# Patient Record
Sex: FEMALE | Race: WHITE | NOT HISPANIC OR LATINO | Employment: FULL TIME | URBAN - NONMETROPOLITAN AREA
[De-identification: names, ages, dates, MRNs, and addresses within clinical notes are randomized per-mention and may not be internally consistent; named-entity substitution may affect disease eponyms.]

---

## 2017-10-09 ENCOUNTER — TRANSCRIBE ORDERS (OUTPATIENT)
Dept: ADMINISTRATIVE | Facility: HOSPITAL | Age: 42
End: 2017-10-09

## 2017-10-09 DIAGNOSIS — Z12.31 VISIT FOR SCREENING MAMMOGRAM: Primary | ICD-10-CM

## 2019-10-23 ENCOUNTER — OFFICE VISIT (OUTPATIENT)
Dept: OBSTETRICS AND GYNECOLOGY | Facility: CLINIC | Age: 44
End: 2019-10-23

## 2019-10-23 VITALS
HEIGHT: 60 IN | BODY MASS INDEX: 30.39 KG/M2 | WEIGHT: 154.8 LBS | DIASTOLIC BLOOD PRESSURE: 76 MMHG | SYSTOLIC BLOOD PRESSURE: 120 MMHG

## 2019-10-23 DIAGNOSIS — N80.9 ENDOMETRIOSIS: ICD-10-CM

## 2019-10-23 DIAGNOSIS — G89.29 CHRONIC PELVIC PAIN IN FEMALE: ICD-10-CM

## 2019-10-23 DIAGNOSIS — Z87.42 HISTORY OF OVARIAN CYST: Primary | ICD-10-CM

## 2019-10-23 DIAGNOSIS — R10.2 CHRONIC PELVIC PAIN IN FEMALE: ICD-10-CM

## 2019-10-23 PROCEDURE — 99203 OFFICE O/P NEW LOW 30 MIN: CPT | Performed by: OBSTETRICS & GYNECOLOGY

## 2019-10-23 RX ORDER — OMEPRAZOLE 20 MG/1
1 TABLET, DELAYED RELEASE ORAL 2 TIMES DAILY
Status: ON HOLD | COMMUNITY
Start: 2015-03-13 | End: 2019-12-16

## 2019-10-23 RX ORDER — ESCITALOPRAM OXALATE 20 MG/1
1 TABLET ORAL DAILY
COMMUNITY
Start: 2019-10-21

## 2019-10-23 RX ORDER — NORETHINDRONE ACETATE/ETHINYL ESTRADIOL AND FERROUS FUMARATE 1MG-20(24)
1 KIT ORAL DAILY
COMMUNITY
Start: 2019-10-12 | End: 2020-01-13

## 2019-10-23 NOTE — PROGRESS NOTES
"   Chief Complaint   Patient presents with   • Pelvic Pain     also c/o right sided pain, right sided back pain, and pain in right thigh   • Vaginal Bleeding     patient takes continuous birth control pills, states that she hasn't had a period in years, but had some bleeding today.  being treated with BCP's due to ovarian cyst formation.  s/p endometrial ablation.       Chloé Velez is a 44 y.o. year old  presenting to be seen because of complaints of chronic pelvic pain and recurrent ovarian cyst.  This patient has 3 living children with one set of twins.  She had 2 vaginal deliveries.  She had a miscarriage with a suction D&C.  She has had a procedure of combined laparoscopy with tubal sterilization, CO2 laser ablation of endometriosis, and a hysteroscopy/D&C/endometrial ablation.  She is also had a cholecystectomy for chronic cholecystitis.  She currently takes Caleb FE oral contraceptives to suppress her ovaries.  She has had recent breakthrough bleeding and has had persistent right lower quadrant pain.  She denies bowel or urinary symptoms.    Social History     Substance and Sexual Activity   Sexual Activity Yes   • Partners: Male   • Birth control/protection: Surgical     SCREENING TESTS    2012 2016 2017 2018 2019   Age                         PAP        \"Neg\"                 HPV high risk                         Mammogram                         DAT score                         Breast MRI                         Lipids                         Vitamin D                         Colonoscopy                         DEXA  Frax (hip/any)                         Ovarian Screen                             No Additional Complaints Reported    The following portions of the patient's history were reviewed and updated as appropriate:vital signs and   She  has a past medical history of Endometriosis and History of " "ovarian cyst.  She does not have any pertinent problems on file.  She  has a past surgical history that includes Cholecystectomy; Breast biopsy (Left, 1993); Breast biopsy (Left, 2008); Breast cyst excision (Left); Pelvic laparoscopy; d&c with suction; and hysteroscopy endometrial ablation tubal sterilization.  Her family history includes Breast cancer in her maternal grandmother; Colon cancer in her paternal grandfather; Heart disease in her father.  She  reports that she has never smoked. She has never used smokeless tobacco. She reports that she does not drink alcohol or use drugs.  Current Outpatient Medications   Medication Sig Dispense Refill   • omeprazole OTC (PrilOSEC OTC) 20 MG EC tablet Take 1 tablet by mouth 2 (Two) Times a Day.     • escitalopram (LEXAPRO) 20 MG tablet Take 1 tablet by mouth Daily.     • ZENOBIA 24 FE 1-20 MG-MCG(24) per tablet Take 1 tablet by mouth Daily.       No current facility-administered medications for this visit.      She has No Known Allergies..        Review of Systems  A comprehensive review of systems was done.  Constitutional: negative for fever, chills, activity change, appetite change, fatigue and unexpected weight change.  Respiratory: negative  Cardiovascular: negative  Gastrointestinal: negative  Genitourinary:positive for pelvic pain  Musculoskeletal:negative  Behavioral/Psych: negative          /76   Ht 152.4 cm (60\")   Wt 70.2 kg (154 lb 12.8 oz)   Breastfeeding? No   BMI 30.23 kg/m²     Physical Exam    General:  alert; cooperative; well developed; well nourished   Skin:  No suspicious lesions seen   Thyroid: normal to inspection and palpation   Lungs:  clear to auscultation bilaterally   Heart:  regular rate and rhythm, S1, S2 normal, no murmur, click, rub or gallop   Breasts:  Not performed.   Abdomen: soft, non-tender; no masses  no umbilical or inguinal hernias are present  no hepato-splenomegaly   Pelvis: Clinical staff was present for exam  External " genitalia:  normal appearance of the external genitalia including Bartholin's and [place]'s glands.  Vaginal:  normal pink mucosa without prolapse or lesions.  Cervix:  normal appearance.  Uterus:  Irregular in shape and mildly enlarged.  ? left adnexal fullness  Adnexa:  fullness left adnexa  Rectal:  anus visually normal appearing. recto-vaginal exam unremarkable and confirms findings;     Lab Review   No data reviewed    Imaging   No data reviewed    Medical counseling was given to patient for the following topics: diagnostic results, instructions for management, risk factor reductions, prognosis and impressions . Total time of the encounter was 33 minute(s) and 19 minute(s)  was spent in face to face counseling.  I have discussed the patient's medical history with her in detail.  The patient desires to have treatment here in [place].  She desires to have a procedure to enable her to be free of pain.  I have informed the patient that there can be no guarantee of pain relief regardless of the procedure that we choose.  I have counseled her that she needs a pelvic ultrasound for further evaluation of the uterus, tubes, and ovaries.  I have counseled her that if she wants to be conservative she could have a unilateral salpingo-oophorectomy to attempt to relieve her pain and this could be done laparoscopically.  I have counseled her that a more definitive treatment would be removal of the uterus, tubes, and ovaries and this could be done in a minimally-invasive fashion.  She is agreeable to have an ultrasound and we will review these results before deciding on a method of management.          ASSESSMENT  Problems Addressed this Visit        Other    History of ovarian cyst - Primary    Relevant Orders    US Non-ob Transvaginal    Endometriosis      Other Visit Diagnoses     Chronic pelvic pain in female        Relevant Orders    US Non-ob Transvaginal           Substance History:    reports that she has never smoked.  She has never used smokeless tobacco.    reports that she does not drink alcohol.    reports that she does not use drugs.    Substance use counseling is not indicated based on patient history.      PLAN    · Medications prescribed this encounter:  No orders of the defined types were placed in this encounter.  · Pelvic ultrasound scan-a normal size anteverted uterus.  The endometrial stripe cannot be measured due to prior ablation.  Her ovaries are normal in size.  · I have indicated to the patient that I would prefer that she have a CT scan of the abdomen and pelvis to rule out nephrolithiasis.  If she does not have any evidence of renal stones I would consider doing a laparoscopic right salpingo-oophorectomy for chronic right lower quadrant pain and recurrent cyst formation.  She will notify me if she desires this procedure.  I have discussed the risks of laparoscopy with her.  · She is taking Caleb FE oral contracteptives  · Follow up: PRN based on ultrasound scan  · Calcium, 600 mg/ Vit. D, 400 IU daily     *Please note that portions of this documentation may have been completed with a voice recognition program.  Efforts were made to edit this dictation, but occasional words may have been mistranscribed.     This note was electronically signed.    ANTONIO Vela MD  October 23, 2019  1:34 PM

## 2019-10-24 DIAGNOSIS — M54.9 OTHER CHRONIC BACK PAIN: ICD-10-CM

## 2019-10-24 DIAGNOSIS — G89.29 OTHER CHRONIC BACK PAIN: ICD-10-CM

## 2019-10-24 DIAGNOSIS — R10.30 LOWER ABDOMINAL PAIN: Primary | ICD-10-CM

## 2019-10-24 DIAGNOSIS — R10.84 GENERALIZED ABDOMINAL PAIN: ICD-10-CM

## 2019-10-29 ENCOUNTER — HOSPITAL ENCOUNTER (OUTPATIENT)
Dept: CT IMAGING | Facility: HOSPITAL | Age: 44
Discharge: HOME | End: 2019-10-29
Admitting: SURGERY

## 2019-10-29 DIAGNOSIS — R10.30 LOWER ABDOMINAL PAIN: ICD-10-CM

## 2019-10-29 DIAGNOSIS — G89.29 CHRONIC PELVIC PAIN IN FEMALE: Primary | ICD-10-CM

## 2019-10-29 DIAGNOSIS — G89.29 OTHER CHRONIC BACK PAIN: ICD-10-CM

## 2019-10-29 DIAGNOSIS — M54.9 OTHER CHRONIC BACK PAIN: ICD-10-CM

## 2019-10-29 DIAGNOSIS — R10.2 CHRONIC PELVIC PAIN IN FEMALE: Primary | ICD-10-CM

## 2019-10-29 DIAGNOSIS — R10.84 GENERALIZED ABDOMINAL PAIN: ICD-10-CM

## 2019-10-29 PROCEDURE — 0 IOVERSOL 68 % SOLUTION: Performed by: SURGERY

## 2019-10-29 PROCEDURE — 74177 CT ABD & PELVIS W/CONTRAST: CPT | Performed by: RADIOLOGY

## 2019-10-29 PROCEDURE — 74177 CT ABD & PELVIS W/CONTRAST: CPT

## 2019-10-29 RX ADMIN — IOVERSOL 100 ML: 678 INJECTION INTRA-ARTERIAL; INTRAVENOUS at 08:57

## 2019-10-30 PROBLEM — R10.2 CHRONIC PELVIC PAIN IN FEMALE: Status: ACTIVE | Noted: 2019-10-30

## 2019-10-30 PROBLEM — G89.29 CHRONIC PELVIC PAIN IN FEMALE: Status: ACTIVE | Noted: 2019-10-30

## 2019-12-04 ENCOUNTER — APPOINTMENT (OUTPATIENT)
Dept: PREADMISSION TESTING | Facility: HOSPITAL | Age: 44
End: 2019-12-04

## 2019-12-04 ENCOUNTER — OFFICE VISIT (OUTPATIENT)
Dept: OBSTETRICS AND GYNECOLOGY | Facility: CLINIC | Age: 44
End: 2019-12-04

## 2019-12-04 VITALS
BODY MASS INDEX: 30.86 KG/M2 | SYSTOLIC BLOOD PRESSURE: 124 MMHG | HEIGHT: 60 IN | HEART RATE: 74 BPM | WEIGHT: 157.2 LBS | OXYGEN SATURATION: 100 % | DIASTOLIC BLOOD PRESSURE: 84 MMHG

## 2019-12-04 VITALS — HEIGHT: 60 IN | WEIGHT: 157 LBS | BODY MASS INDEX: 30.82 KG/M2

## 2019-12-04 DIAGNOSIS — N80.9 ENDOMETRIOSIS: ICD-10-CM

## 2019-12-04 DIAGNOSIS — Z01.818 PREOPERATIVE TESTING: ICD-10-CM

## 2019-12-04 DIAGNOSIS — G89.29 CHRONIC PELVIC PAIN IN FEMALE: Primary | ICD-10-CM

## 2019-12-04 DIAGNOSIS — R10.2 CHRONIC PELVIC PAIN IN FEMALE: Primary | ICD-10-CM

## 2019-12-04 LAB
ANION GAP SERPL CALCULATED.3IONS-SCNC: 10 MMOL/L (ref 5–15)
BACTERIA UR QL AUTO: NORMAL /HPF
BASOPHILS # BLD AUTO: 0.04 10*3/MM3 (ref 0–0.2)
BASOPHILS NFR BLD AUTO: 0.5 % (ref 0–1.5)
BILIRUB UR QL STRIP: NEGATIVE
BUN BLD-MCNC: 11 MG/DL (ref 6–20)
BUN/CREAT SERPL: 15.5 (ref 7–25)
CALCIUM SPEC-SCNC: 9.8 MG/DL (ref 8.6–10.5)
CHLORIDE SERPL-SCNC: 103 MMOL/L (ref 98–107)
CLARITY UR: CLEAR
CO2 SERPL-SCNC: 27 MMOL/L (ref 22–29)
COLOR UR: YELLOW
CREAT BLD-MCNC: 0.71 MG/DL (ref 0.57–1)
DEPRECATED RDW RBC AUTO: 44.9 FL (ref 37–54)
EOSINOPHIL # BLD AUTO: 0.14 10*3/MM3 (ref 0–0.4)
EOSINOPHIL NFR BLD AUTO: 1.7 % (ref 0.3–6.2)
ERYTHROCYTE [DISTWIDTH] IN BLOOD BY AUTOMATED COUNT: 13.6 % (ref 12.3–15.4)
GFR SERPL CREATININE-BSD FRML MDRD: 90 ML/MIN/1.73
GLUCOSE BLD-MCNC: 94 MG/DL (ref 65–99)
GLUCOSE UR STRIP-MCNC: NEGATIVE MG/DL
HCT VFR BLD AUTO: 42.8 % (ref 34–46.6)
HGB BLD-MCNC: 13.4 G/DL (ref 12–15.9)
HGB UR QL STRIP.AUTO: ABNORMAL
HYALINE CASTS UR QL AUTO: NORMAL /LPF
IMM GRANULOCYTES # BLD AUTO: 0.02 10*3/MM3 (ref 0–0.05)
IMM GRANULOCYTES NFR BLD AUTO: 0.2 % (ref 0–0.5)
KETONES UR QL STRIP: NEGATIVE
LEUKOCYTE ESTERASE UR QL STRIP.AUTO: NEGATIVE
LYMPHOCYTES # BLD AUTO: 2.8 10*3/MM3 (ref 0.7–3.1)
LYMPHOCYTES NFR BLD AUTO: 34.1 % (ref 19.6–45.3)
MCH RBC QN AUTO: 28 PG (ref 26.6–33)
MCHC RBC AUTO-ENTMCNC: 31.3 G/DL (ref 31.5–35.7)
MCV RBC AUTO: 89.4 FL (ref 79–97)
MONOCYTES # BLD AUTO: 0.6 10*3/MM3 (ref 0.1–0.9)
MONOCYTES NFR BLD AUTO: 7.3 % (ref 5–12)
NEUTROPHILS # BLD AUTO: 4.6 10*3/MM3 (ref 1.7–7)
NEUTROPHILS NFR BLD AUTO: 56.2 % (ref 42.7–76)
NITRITE UR QL STRIP: NEGATIVE
NRBC BLD AUTO-RTO: 0 /100 WBC (ref 0–0.2)
PH UR STRIP.AUTO: 6.5 [PH] (ref 5–8)
PLATELET # BLD AUTO: 362 10*3/MM3 (ref 140–450)
PMV BLD AUTO: 9.6 FL (ref 6–12)
POTASSIUM BLD-SCNC: 4.4 MMOL/L (ref 3.5–5.2)
PROT UR QL STRIP: NEGATIVE
RBC # BLD AUTO: 4.79 10*6/MM3 (ref 3.77–5.28)
RBC # UR: NORMAL /HPF
REF LAB TEST METHOD: NORMAL
SODIUM BLD-SCNC: 140 MMOL/L (ref 136–145)
SP GR UR STRIP: 1.01 (ref 1–1.03)
SQUAMOUS #/AREA URNS HPF: NORMAL /HPF
UROBILINOGEN UR QL STRIP: ABNORMAL
WBC NRBC COR # BLD: 8.2 10*3/MM3 (ref 3.4–10.8)
WBC UR QL AUTO: NORMAL /HPF

## 2019-12-04 PROCEDURE — 36415 COLL VENOUS BLD VENIPUNCTURE: CPT

## 2019-12-04 PROCEDURE — 81001 URINALYSIS AUTO W/SCOPE: CPT | Performed by: OBSTETRICS & GYNECOLOGY

## 2019-12-04 PROCEDURE — S0260 H&P FOR SURGERY: HCPCS | Performed by: OBSTETRICS & GYNECOLOGY

## 2019-12-04 PROCEDURE — 80048 BASIC METABOLIC PNL TOTAL CA: CPT | Performed by: OBSTETRICS & GYNECOLOGY

## 2019-12-04 PROCEDURE — 85025 COMPLETE CBC W/AUTO DIFF WBC: CPT | Performed by: OBSTETRICS & GYNECOLOGY

## 2019-12-04 RX ORDER — IBUPROFEN 600 MG/1
600 TABLET ORAL EVERY 6 HOURS PRN
Qty: 40 TABLET | Refills: 0 | Status: SHIPPED | OUTPATIENT
Start: 2019-12-16 | End: 2019-12-26

## 2019-12-04 RX ORDER — DOCUSATE SODIUM 100 MG/1
100 CAPSULE, LIQUID FILLED ORAL 2 TIMES DAILY
Qty: 20 CAPSULE | Refills: 0 | Status: SHIPPED | OUTPATIENT
Start: 2019-12-16 | End: 2019-12-26

## 2019-12-04 NOTE — H&P (VIEW-ONLY)
History and Physical    Chief Complaint: Chronic pelvic pain and right lower quadrant pain    Chloé Velez is a 44 y.o., , who presented to my office earlier this year with complaints of persistent chronic pelvic pain as well as right lower quadrant pain.  She has a prior history of laser ablation of endometriosis along with a hysteroscopy/D&C/endometrial ablation and tubal sterilization.  Her pain is worse on the right side and she has had intermittent right ovarian cyst.  She desires removal of her uterus/cervix as well as her right tube and ovary and left fallopian tube.  She prefers a minimally–invasive approach to surgery.  She has washed an informational tape about robotic hysterectomy.  The surgical risks of bowel, bladder, ureteral injury; bleeding, infection, and anesthetic risks were explained to the patient.  She voiced understanding of these risks.  Informed consent was signed.  She has previously had a laparoscopic cholecystectomy.  She has been taking a low-dose oral contraceptive but does not have pain relief.    Past Medical History:   Diagnosis Date   • Endometriosis    • History of ovarian cyst        Allergies: Patient has no known allergies.    Medications:   Current Outpatient Medications:   •  [START ON 2019] docusate sodium (COLACE) 100 MG capsule, Take 1 capsule by mouth 2 (Two) Times a Day for 10 days., Disp: 20 capsule, Rfl: 0  •  escitalopram (LEXAPRO) 20 MG tablet, Take 1 tablet by mouth Daily., Disp: , Rfl:   •  [START ON 2019] ibuprofen (ADVIL,MOTRIN) 600 MG tablet, Take 1 tablet by mouth Every 6 (Six) Hours As Needed for Mild Pain  for up to 10 days., Disp: 40 tablet, Rfl: 0  •  ZENOBIA 24 FE 1-20 MG-MCG(24) per tablet, Take 1 tablet by mouth Daily., Disp: , Rfl:   •  omeprazole OTC (PrilOSEC OTC) 20 MG EC tablet, Take 1 tablet by mouth 2 (Two) Times a Day., Disp: , Rfl:     Previous Surgery:   Past Surgical History:   Procedure Laterality Date   • BREAST BIOPSY  Left 1993    benign   • BREAST BIOPSY Left 2008    benign   • BREAST CYST EXCISION Left     x 2   • CHOLECYSTECTOMY     • D&C WITH SUCTION     • HYSTEROSCOPY ENDOMETRIAL ABLATION TUBAL STERILIZATION      D&C, drainage of ovarian cyst   • PELVIC LAPAROSCOPY         Review of Systems  A comprehensive review of systems was negative.  Constitutional: negative for fever, chills, activity change, appetite change, fatigue and unexpected weight change.  Respiratory: negative  Cardiovascular: negative  Gastrointestinal: negative  Genitourinary:pelvic pain  Musculoskeletal:negative  Behavioral/Psych: negative      Social History     Tobacco Use   • Smoking status: Never Smoker   • Smokeless tobacco: Never Used   Substance Use Topics   • Alcohol use: No       Recent Vitals       10/27/2016 10/23/2019 12/4/2019       BP:  117/81  120/76  124/84     Pulse:  90  —  74     Weight:  73.7 kg (162 lb 6.4 oz)  70.2 kg (154 lb 12.8 oz)  71.3 kg (157 lb 3.2 oz)     BMI (Calculated):  31.7  30.2  30.7           Physical Exam  General:  alert; cooperative; well developed; well nourished   Skin:  No suspicious lesions seen   Thyroid: normal to inspection and palpation   Lungs:  clear to auscultation bilaterally   Heart:  regular rate and rhythm, S1, S2 normal, no murmur, click, rub or gallop   Breasts:  Examined in supine position  Symmetric without masses or skin dimpling  Nipples normal without inversion, lesions or discharge  There are no palpable axillary nodes   Abdomen: soft, non-tender; no masses  no umbilical or inguinal hernias are present  no hepato-splenomegaly   Pelvis: Clinical staff was present for exam  External genitalia:  normal appearance of the external genitalia including Bartholin's and [place]'s glands.  Vaginal:  normal pink mucosa without prolapse or lesions.  Cervix:  normal appearance.  Uterus:  normal size, shape and consistency. anteverted;  Adnexa:  normal bimanual exam of the adnexa. tender right adnexa  Rectal:   anus visually normal appearing. recto-vaginal exam unremarkable and confirms findings;         Injury to bowel, Injury to bladder, Injury to ureter, bleeding, infection and anesthestic risks were explained to the patient and she voiced understanding. Informed consent was signed.    No contraindications to planned surgery were detected      Impression: 10 Chronic pelvic pain [R10.2], 2) Endometriosis of ovary N 80.9]        Plan: 1) Robotically-assisted total laparoscopic hysterectomy/right salpingo-oophorectomy/left salpingectomy/vaginal cuff suspension to uterosacral ligaments      *Please note that portions of this documentation may have been completed with a voice recognition program.  Efforts were made to edit this dictation, but occasional words may have been mistranscribed.  This note was electronically signed.    ANTONIO Vela MD  December 4, 2019  3:15 PM

## 2019-12-04 NOTE — H&P
History and Physical    Chief Complaint: Chronic pelvic pain and right lower quadrant pain    Chloé Velez is a 44 y.o., , who presented to my office earlier this year with complaints of persistent chronic pelvic pain as well as right lower quadrant pain.  She has a prior history of laser ablation of endometriosis along with a hysteroscopy/D&C/endometrial ablation and tubal sterilization.  Her pain is worse on the right side and she has had intermittent right ovarian cyst.  She desires removal of her uterus/cervix as well as her right tube and ovary and left fallopian tube.  She prefers a minimally-invasive approach to surgery.  She has washed an informational tape about robotic hysterectomy.  The surgical risks of bowel, bladder, ureteral injury; bleeding, infection, and anesthetic risks were explained to the patient.  She voiced understanding of these risks.  Informed consent was signed.  She has previously had a laparoscopic cholecystectomy.  She has been taking a low-dose oral contraceptive but does not have pain relief.    Past Medical History:   Diagnosis Date   • Endometriosis    • History of ovarian cyst        Allergies: Patient has no known allergies.    Medications:   Current Outpatient Medications:   •  [START ON 2019] docusate sodium (COLACE) 100 MG capsule, Take 1 capsule by mouth 2 (Two) Times a Day for 10 days., Disp: 20 capsule, Rfl: 0  •  escitalopram (LEXAPRO) 20 MG tablet, Take 1 tablet by mouth Daily., Disp: , Rfl:   •  [START ON 2019] ibuprofen (ADVIL,MOTRIN) 600 MG tablet, Take 1 tablet by mouth Every 6 (Six) Hours As Needed for Mild Pain  for up to 10 days., Disp: 40 tablet, Rfl: 0  •  ZENOBIA 24 FE 1-20 MG-MCG(24) per tablet, Take 1 tablet by mouth Daily., Disp: , Rfl:   •  omeprazole OTC (PrilOSEC OTC) 20 MG EC tablet, Take 1 tablet by mouth 2 (Two) Times a Day., Disp: , Rfl:     Previous Surgery:   Past Surgical History:   Procedure Laterality Date   • BREAST BIOPSY  Left 1993    benign   • BREAST BIOPSY Left 2008    benign   • BREAST CYST EXCISION Left     x 2   • CHOLECYSTECTOMY     • D&C WITH SUCTION     • HYSTEROSCOPY ENDOMETRIAL ABLATION TUBAL STERILIZATION      D&C, drainage of ovarian cyst   • PELVIC LAPAROSCOPY         Review of Systems  A comprehensive review of systems was negative.  Constitutional: negative for fever, chills, activity change, appetite change, fatigue and unexpected weight change.  Respiratory: negative  Cardiovascular: negative  Gastrointestinal: negative  Genitourinary:pelvic pain  Musculoskeletal:negative  Behavioral/Psych: negative      Social History     Tobacco Use   • Smoking status: Never Smoker   • Smokeless tobacco: Never Used   Substance Use Topics   • Alcohol use: No       Recent Vitals       10/27/2016 10/23/2019 12/4/2019       BP:  117/81  120/76  124/84     Pulse:  90  --  74     Weight:  73.7 kg (162 lb 6.4 oz)  70.2 kg (154 lb 12.8 oz)  71.3 kg (157 lb 3.2 oz)     BMI (Calculated):  31.7  30.2  30.7           Physical Exam  General:  alert; cooperative; well developed; well nourished   Skin:  No suspicious lesions seen   Thyroid: normal to inspection and palpation   Lungs:  clear to auscultation bilaterally   Heart:  regular rate and rhythm, S1, S2 normal, no murmur, click, rub or gallop   Breasts:  Examined in supine position  Symmetric without masses or skin dimpling  Nipples normal without inversion, lesions or discharge  There are no palpable axillary nodes   Abdomen: soft, non-tender; no masses  no umbilical or inguinal hernias are present  no hepato-splenomegaly   Pelvis: Clinical staff was present for exam  External genitalia:  normal appearance of the external genitalia including Bartholin's and [place]'s glands.  Vaginal:  normal pink mucosa without prolapse or lesions.  Cervix:  normal appearance.  Uterus:  normal size, shape and consistency. anteverted;  Adnexa:  normal bimanual exam of the adnexa. tender right  adnexa  Rectal:  anus visually normal appearing. recto-vaginal exam unremarkable and confirms findings;         Injury to bowel, Injury to bladder, Injury to ureter, bleeding, infection and anesthestic risks were explained to the patient and she voiced understanding. Informed consent was signed.    No contraindications to planned surgery were detected      Impression: 10 Chronic pelvic pain [R10.2], 2) Endometriosis of ovary N 80.9]        Plan: 1) Robotically-assisted total laparoscopic hysterectomy/right salpingo-oophorectomy/left salpingectomy/vaginal cuff suspension to uterosacral ligaments      *Please note that portions of this documentation may have been completed with a voice recognition program.  Efforts were made to edit this dictation, but occasional words may have been mistranscribed.  This note was electronically signed.    ANTONIO Vela MD  December 4, 2019  3:15 PM

## 2019-12-10 ENCOUNTER — TELEPHONE (OUTPATIENT)
Dept: OBSTETRICS AND GYNECOLOGY | Facility: CLINIC | Age: 44
End: 2019-12-10

## 2019-12-10 NOTE — TELEPHONE ENCOUNTER
Spoke with patient and advised her that there was a shuffle with the surgery schedule and instead of her surgery being at 10 am it would now be at 0730 and she would need to be there no later than 0530.  Patient verbalized understanding and had no questions at this time.

## 2019-12-16 ENCOUNTER — ANESTHESIA (OUTPATIENT)
Dept: PERIOP | Facility: HOSPITAL | Age: 44
End: 2019-12-16

## 2019-12-16 ENCOUNTER — ANESTHESIA EVENT (OUTPATIENT)
Dept: PERIOP | Facility: HOSPITAL | Age: 44
End: 2019-12-16

## 2019-12-16 ENCOUNTER — HOSPITAL ENCOUNTER (OUTPATIENT)
Facility: HOSPITAL | Age: 44
Discharge: HOME | End: 2019-12-16
Attending: OBSTETRICS & GYNECOLOGY | Admitting: OBSTETRICS & GYNECOLOGY

## 2019-12-16 VITALS
TEMPERATURE: 98 F | WEIGHT: 157 LBS | HEART RATE: 99 BPM | RESPIRATION RATE: 20 BRPM | BODY MASS INDEX: 30.82 KG/M2 | OXYGEN SATURATION: 95 % | SYSTOLIC BLOOD PRESSURE: 134 MMHG | HEIGHT: 60 IN | DIASTOLIC BLOOD PRESSURE: 55 MMHG

## 2019-12-16 DIAGNOSIS — R10.2 CHRONIC PELVIC PAIN IN FEMALE: ICD-10-CM

## 2019-12-16 DIAGNOSIS — G89.29 CHRONIC PELVIC PAIN IN FEMALE: ICD-10-CM

## 2019-12-16 LAB
B-HCG UR QL: NEGATIVE
INTERNAL NEGATIVE CONTROL: NEGATIVE
INTERNAL POSITIVE CONTROL: POSITIVE
Lab: NORMAL

## 2019-12-16 PROCEDURE — 25010000002 PROPOFOL 10 MG/ML EMULSION: Performed by: NURSE ANESTHETIST, CERTIFIED REGISTERED

## 2019-12-16 PROCEDURE — 25010000002 DEXAMETHASONE PER 1 MG: Performed by: NURSE ANESTHETIST, CERTIFIED REGISTERED

## 2019-12-16 PROCEDURE — 25010000002 CEFOXITIN PER 1 G: Performed by: OBSTETRICS & GYNECOLOGY

## 2019-12-16 PROCEDURE — C9290 INJ, BUPIVACAINE LIPOSOME: HCPCS | Performed by: OBSTETRICS & GYNECOLOGY

## 2019-12-16 PROCEDURE — 88312 SPECIAL STAINS GROUP 1: CPT | Performed by: OBSTETRICS & GYNECOLOGY

## 2019-12-16 PROCEDURE — 25010000002 FENTANYL CITRATE (PF) 100 MCG/2ML SOLUTION: Performed by: NURSE ANESTHETIST, CERTIFIED REGISTERED

## 2019-12-16 PROCEDURE — 58571 TLH W/T/O 250 G OR LESS: CPT | Performed by: OBSTETRICS & GYNECOLOGY

## 2019-12-16 PROCEDURE — 25010000002 HYDROMORPHONE PER 4 MG: Performed by: NURSE ANESTHETIST, CERTIFIED REGISTERED

## 2019-12-16 PROCEDURE — 57283 COLPOPEXY INTRAPERITONEAL: CPT | Performed by: OBSTETRICS & GYNECOLOGY

## 2019-12-16 PROCEDURE — 25010000002 NEOSTIGMINE 10 MG/10ML SOLUTION: Performed by: NURSE ANESTHETIST, CERTIFIED REGISTERED

## 2019-12-16 PROCEDURE — 25010000002 KETOROLAC TROMETHAMINE PER 15 MG: Performed by: NURSE ANESTHETIST, CERTIFIED REGISTERED

## 2019-12-16 PROCEDURE — 25010000003 BUPIVACAINE LIPOSOME 1.3 % SUSPENSION 20 ML VIAL: Performed by: OBSTETRICS & GYNECOLOGY

## 2019-12-16 PROCEDURE — 25010000002 ONDANSETRON PER 1 MG: Performed by: NURSE ANESTHETIST, CERTIFIED REGISTERED

## 2019-12-16 PROCEDURE — 81025 URINE PREGNANCY TEST: CPT | Performed by: OBSTETRICS & GYNECOLOGY

## 2019-12-16 PROCEDURE — 88305 TISSUE EXAM BY PATHOLOGIST: CPT | Performed by: OBSTETRICS & GYNECOLOGY

## 2019-12-16 DEVICE — SEALANT WND FIBRIN TISSEEL PREFIL/SYR/PRIMAFZ 4ML: Type: IMPLANTABLE DEVICE | Site: ABDOMEN | Status: FUNCTIONAL

## 2019-12-16 RX ORDER — FENTANYL CITRATE 50 UG/ML
INJECTION, SOLUTION INTRAMUSCULAR; INTRAVENOUS AS NEEDED
Status: DISCONTINUED | OUTPATIENT
Start: 2019-12-16 | End: 2019-12-16 | Stop reason: SURG

## 2019-12-16 RX ORDER — MAGNESIUM HYDROXIDE 1200 MG/15ML
LIQUID ORAL AS NEEDED
Status: DISCONTINUED | OUTPATIENT
Start: 2019-12-16 | End: 2019-12-16 | Stop reason: HOSPADM

## 2019-12-16 RX ORDER — SODIUM CHLORIDE, SODIUM LACTATE, POTASSIUM CHLORIDE, CALCIUM CHLORIDE 600; 310; 30; 20 MG/100ML; MG/100ML; MG/100ML; MG/100ML
9 INJECTION, SOLUTION INTRAVENOUS CONTINUOUS
Status: DISCONTINUED | OUTPATIENT
Start: 2019-12-16 | End: 2019-12-16

## 2019-12-16 RX ORDER — ONDANSETRON 2 MG/ML
4 INJECTION INTRAMUSCULAR; INTRAVENOUS ONCE AS NEEDED
Status: DISCONTINUED | OUTPATIENT
Start: 2019-12-16 | End: 2019-12-16 | Stop reason: HOSPADM

## 2019-12-16 RX ORDER — PROMETHAZINE HYDROCHLORIDE 25 MG/ML
12.5 INJECTION, SOLUTION INTRAMUSCULAR; INTRAVENOUS ONCE AS NEEDED
Status: DISCONTINUED | OUTPATIENT
Start: 2019-12-16 | End: 2019-12-16 | Stop reason: HOSPADM

## 2019-12-16 RX ORDER — HYDROMORPHONE HYDROCHLORIDE 1 MG/ML
0.5 INJECTION, SOLUTION INTRAMUSCULAR; INTRAVENOUS; SUBCUTANEOUS
Status: DISCONTINUED | OUTPATIENT
Start: 2019-12-16 | End: 2019-12-16 | Stop reason: HOSPADM

## 2019-12-16 RX ORDER — SODIUM CHLORIDE 0.9 % (FLUSH) 0.9 %
10 SYRINGE (ML) INJECTION EVERY 12 HOURS SCHEDULED
Status: DISCONTINUED | OUTPATIENT
Start: 2019-12-16 | End: 2019-12-16 | Stop reason: HOSPADM

## 2019-12-16 RX ORDER — GLYCOPYRROLATE 0.2 MG/ML
INJECTION INTRAMUSCULAR; INTRAVENOUS AS NEEDED
Status: DISCONTINUED | OUTPATIENT
Start: 2019-12-16 | End: 2019-12-16 | Stop reason: SURG

## 2019-12-16 RX ORDER — PROPOFOL 10 MG/ML
VIAL (ML) INTRAVENOUS AS NEEDED
Status: DISCONTINUED | OUTPATIENT
Start: 2019-12-16 | End: 2019-12-16 | Stop reason: SURG

## 2019-12-16 RX ORDER — SODIUM CHLORIDE 0.9 % (FLUSH) 0.9 %
10 SYRINGE (ML) INJECTION AS NEEDED
Status: DISCONTINUED | OUTPATIENT
Start: 2019-12-16 | End: 2019-12-16 | Stop reason: HOSPADM

## 2019-12-16 RX ORDER — FAMOTIDINE 10 MG/ML
20 INJECTION, SOLUTION INTRAVENOUS ONCE
Status: DISCONTINUED | OUTPATIENT
Start: 2019-12-16 | End: 2019-12-16

## 2019-12-16 RX ORDER — KETOROLAC TROMETHAMINE 30 MG/ML
INJECTION, SOLUTION INTRAMUSCULAR; INTRAVENOUS AS NEEDED
Status: DISCONTINUED | OUTPATIENT
Start: 2019-12-16 | End: 2019-12-16 | Stop reason: SURG

## 2019-12-16 RX ORDER — NEOSTIGMINE METHYLSULFATE 1 MG/ML
INJECTION, SOLUTION INTRAVENOUS AS NEEDED
Status: DISCONTINUED | OUTPATIENT
Start: 2019-12-16 | End: 2019-12-16 | Stop reason: SURG

## 2019-12-16 RX ORDER — OXYCODONE AND ACETAMINOPHEN 7.5; 325 MG/1; MG/1
1 TABLET ORAL EVERY 4 HOURS PRN
Status: CANCELLED | OUTPATIENT
Start: 2019-12-16 | End: 2019-12-26

## 2019-12-16 RX ORDER — FAMOTIDINE 20 MG/1
40 TABLET, FILM COATED ORAL DAILY
COMMUNITY

## 2019-12-16 RX ORDER — ONDANSETRON 2 MG/ML
INJECTION INTRAMUSCULAR; INTRAVENOUS AS NEEDED
Status: DISCONTINUED | OUTPATIENT
Start: 2019-12-16 | End: 2019-12-16 | Stop reason: SURG

## 2019-12-16 RX ORDER — MEPERIDINE HYDROCHLORIDE 25 MG/ML
12.5 INJECTION INTRAMUSCULAR; INTRAVENOUS; SUBCUTANEOUS
Status: DISCONTINUED | OUTPATIENT
Start: 2019-12-16 | End: 2019-12-16 | Stop reason: HOSPADM

## 2019-12-16 RX ORDER — ROCURONIUM BROMIDE 10 MG/ML
INJECTION, SOLUTION INTRAVENOUS AS NEEDED
Status: DISCONTINUED | OUTPATIENT
Start: 2019-12-16 | End: 2019-12-16 | Stop reason: SURG

## 2019-12-16 RX ORDER — DEXAMETHASONE SODIUM PHOSPHATE 4 MG/ML
INJECTION, SOLUTION INTRA-ARTICULAR; INTRALESIONAL; INTRAMUSCULAR; INTRAVENOUS; SOFT TISSUE AS NEEDED
Status: DISCONTINUED | OUTPATIENT
Start: 2019-12-16 | End: 2019-12-16 | Stop reason: SURG

## 2019-12-16 RX ORDER — PROMETHAZINE HYDROCHLORIDE 25 MG/1
25 TABLET ORAL ONCE AS NEEDED
Status: DISCONTINUED | OUTPATIENT
Start: 2019-12-16 | End: 2019-12-16 | Stop reason: HOSPADM

## 2019-12-16 RX ORDER — PROMETHAZINE HYDROCHLORIDE 25 MG/1
25 SUPPOSITORY RECTAL ONCE AS NEEDED
Status: DISCONTINUED | OUTPATIENT
Start: 2019-12-16 | End: 2019-12-16 | Stop reason: HOSPADM

## 2019-12-16 RX ORDER — LIDOCAINE HYDROCHLORIDE 10 MG/ML
0.5 INJECTION, SOLUTION EPIDURAL; INFILTRATION; INTRACAUDAL; PERINEURAL ONCE AS NEEDED
Status: COMPLETED | OUTPATIENT
Start: 2019-12-16 | End: 2019-12-16

## 2019-12-16 RX ORDER — SIMETHICONE 80 MG
80 TABLET,CHEWABLE ORAL 4 TIMES DAILY PRN
Status: CANCELLED | OUTPATIENT
Start: 2019-12-16

## 2019-12-16 RX ORDER — FAMOTIDINE 20 MG/1
20 TABLET, FILM COATED ORAL ONCE
Status: DISCONTINUED | OUTPATIENT
Start: 2019-12-16 | End: 2019-12-16

## 2019-12-16 RX ORDER — FAMOTIDINE 20 MG/1
20 TABLET, FILM COATED ORAL
Status: DISCONTINUED | OUTPATIENT
Start: 2019-12-16 | End: 2019-12-16 | Stop reason: HOSPADM

## 2019-12-16 RX ORDER — FENTANYL CITRATE 50 UG/ML
50 INJECTION, SOLUTION INTRAMUSCULAR; INTRAVENOUS
Status: DISCONTINUED | OUTPATIENT
Start: 2019-12-16 | End: 2019-12-16 | Stop reason: HOSPADM

## 2019-12-16 RX ORDER — LIDOCAINE HYDROCHLORIDE 10 MG/ML
0.5 INJECTION, SOLUTION EPIDURAL; INFILTRATION; INTRACAUDAL; PERINEURAL ONCE AS NEEDED
Status: DISCONTINUED | OUTPATIENT
Start: 2019-12-16 | End: 2019-12-16

## 2019-12-16 RX ORDER — LIDOCAINE HYDROCHLORIDE 10 MG/ML
INJECTION, SOLUTION EPIDURAL; INFILTRATION; INTRACAUDAL; PERINEURAL AS NEEDED
Status: DISCONTINUED | OUTPATIENT
Start: 2019-12-16 | End: 2019-12-16 | Stop reason: SURG

## 2019-12-16 RX ORDER — SODIUM CHLORIDE, SODIUM LACTATE, POTASSIUM CHLORIDE, CALCIUM CHLORIDE 600; 310; 30; 20 MG/100ML; MG/100ML; MG/100ML; MG/100ML
125 INJECTION, SOLUTION INTRAVENOUS CONTINUOUS
Status: CANCELLED | OUTPATIENT
Start: 2019-12-16

## 2019-12-16 RX ORDER — SODIUM CHLORIDE, SODIUM LACTATE, POTASSIUM CHLORIDE, CALCIUM CHLORIDE 600; 310; 30; 20 MG/100ML; MG/100ML; MG/100ML; MG/100ML
9 INJECTION, SOLUTION INTRAVENOUS CONTINUOUS PRN
Status: DISCONTINUED | OUTPATIENT
Start: 2019-12-16 | End: 2019-12-16 | Stop reason: HOSPADM

## 2019-12-16 RX ORDER — NALOXONE HCL 0.4 MG/ML
0.1 VIAL (ML) INJECTION
Status: CANCELLED | OUTPATIENT
Start: 2019-12-16

## 2019-12-16 RX ADMIN — LIDOCAINE HYDROCHLORIDE 50 MG: 10 INJECTION, SOLUTION EPIDURAL; INFILTRATION; INTRACAUDAL; PERINEURAL at 07:38

## 2019-12-16 RX ADMIN — ONDANSETRON 4 MG: 2 INJECTION INTRAMUSCULAR; INTRAVENOUS at 08:48

## 2019-12-16 RX ADMIN — DEXAMETHASONE SODIUM PHOSPHATE 8 MG: 4 INJECTION, SOLUTION INTRAMUSCULAR; INTRAVENOUS at 07:45

## 2019-12-16 RX ADMIN — PROPOFOL 200 MG: 10 INJECTION, EMULSION INTRAVENOUS at 07:38

## 2019-12-16 RX ADMIN — ROCURONIUM BROMIDE 40 MG: 10 INJECTION INTRAVENOUS at 07:38

## 2019-12-16 RX ADMIN — LIDOCAINE HYDROCHLORIDE 0.5 ML: 10 INJECTION, SOLUTION EPIDURAL; INFILTRATION; INTRACAUDAL; PERINEURAL at 06:23

## 2019-12-16 RX ADMIN — FENTANYL CITRATE 50 MCG: 0.05 INJECTION, SOLUTION INTRAMUSCULAR; INTRAVENOUS at 09:40

## 2019-12-16 RX ADMIN — ROCURONIUM BROMIDE 5 MG: 10 INJECTION INTRAVENOUS at 08:32

## 2019-12-16 RX ADMIN — SODIUM CHLORIDE, POTASSIUM CHLORIDE, SODIUM LACTATE AND CALCIUM CHLORIDE 500 ML: 600; 310; 30; 20 INJECTION, SOLUTION INTRAVENOUS at 09:12

## 2019-12-16 RX ADMIN — KETOROLAC TROMETHAMINE 30 MG: 30 INJECTION, SOLUTION INTRAMUSCULAR at 08:48

## 2019-12-16 RX ADMIN — FENTANYL CITRATE 50 MCG: 0.05 INJECTION, SOLUTION INTRAMUSCULAR; INTRAVENOUS at 09:30

## 2019-12-16 RX ADMIN — FENTANYL CITRATE 250 MCG: 50 INJECTION, SOLUTION INTRAMUSCULAR; INTRAVENOUS at 07:38

## 2019-12-16 RX ADMIN — NEOSTIGMINE METHYLSULFATE 3 MG: 1 INJECTION, SOLUTION INTRAVENOUS at 08:53

## 2019-12-16 RX ADMIN — GLYCOPYRROLATE 0.4 MG: 0.2 INJECTION, SOLUTION INTRAMUSCULAR; INTRAVENOUS at 08:50

## 2019-12-16 RX ADMIN — HYDROMORPHONE HYDROCHLORIDE 0.5 MG: 1 INJECTION, SOLUTION INTRAMUSCULAR; INTRAVENOUS; SUBCUTANEOUS at 09:50

## 2019-12-16 RX ADMIN — CEFOXITIN SODIUM 2 G: 1 POWDER, FOR SOLUTION INTRAVENOUS at 07:30

## 2019-12-16 RX ADMIN — SODIUM CHLORIDE, POTASSIUM CHLORIDE, SODIUM LACTATE AND CALCIUM CHLORIDE 9 ML/HR: 600; 310; 30; 20 INJECTION, SOLUTION INTRAVENOUS at 06:23

## 2019-12-16 NOTE — ANESTHESIA POSTPROCEDURE EVALUATION
Patient: Chloé Velez    Procedure Summary     Date:  12/16/19 Room / Location:   [facility]    Anesthesia Start:  0730 Anesthesia Stop:      Procedure:  TOTAL LAPAROSCOPIC HYSTERECTOMY, BILATERAL SALPINGECTOMIES WITH RIGHT OOPHORECTOMYVAGINAL VAULT SUSPENSION WITH DAVINCI ROBOT, (Right Abdomen) Diagnosis:       Chronic pelvic pain in female      (Chronic pelvic pain in female [R10.2, G89.29])    Surgeon:  Jose R Vela MD Provider:  Evens Trinh MD    Anesthesia Type:  general ASA Status:  2          Anesthesia Type: general    Vitals  Vitals Value Taken Time   BP     Temp     Pulse 65 12/16/2019  9:05 AM   Resp     SpO2 93 % 12/16/2019  9:05 AM   Vitals shown include unvalidated device data.        Post Anesthesia Care and Evaluation    Patient location during evaluation: PACU  Patient participation: complete - patient participated  Level of consciousness: awake and alert  Pain score: 0  Pain management: adequate  Airway patency: patent  Anesthetic complications: No anesthetic complications  PONV Status: none  Cardiovascular status: hemodynamically stable and acceptable  Respiratory status: nonlabored ventilation, acceptable and nasal cannula  Hydration status: acceptable    Comments: 125/74, 74, 16, 99%, 98.0

## 2019-12-16 NOTE — DISCHARGE INSTRUCTIONS
1.  Clean incisions with half-strength peroxide twice a day for ten days.  2.  Activity as tolerated. May go up and down steps slowly. May drive in 5-7 days.  3.  Avoid heavy lifting more than 15 pounds.   4.  Avoid anything per vagina.  5.  Clear liquid diet until passing flatus than soft bland diet.

## 2019-12-16 NOTE — ANESTHESIA PREPROCEDURE EVALUATION
Anesthesia Evaluation     Patient summary reviewed and Nursing notes reviewed   no history of anesthetic complications:  NPO Solid Status: > 8 hours  NPO Liquid Status: > 8 hours           Airway   Mallampati: II  TM distance: >3 FB  Neck ROM: full  No difficulty expected  Dental      Pulmonary - normal exam   (+) shortness of breath,   Cardiovascular - negative cardio ROS and normal exam        Neuro/Psych  (+) psychiatric history,     GI/Hepatic/Renal/Endo    (+)  GERD,      Musculoskeletal     Abdominal    Substance History      OB/GYN          Other                        Anesthesia Plan    ASA 2     general     intravenous induction     Anesthetic plan, all risks, benefits, and alternatives have been provided, discussed and informed consent has been obtained with: patient.    Plan discussed with CRNA.

## 2019-12-16 NOTE — ANESTHESIA PROCEDURE NOTES
Airway  Urgency: elective    Date/Time: 12/16/2019 7:39 AM  Airway not difficult    General Information and Staff    Patient location during procedure: OR  CRNA: Jeff Heredia CRNA    Indications and Patient Condition  Indications for airway management: airway protection    Preoxygenated: yes  MILS not maintained throughout  Mask difficulty assessment: 1 - vent by mask    Final Airway Details  Final airway type: endotracheal airway      Successful airway: ETT  Cuffed: yes   Successful intubation technique: direct laryngoscopy  Facilitating devices/methods: intubating stylet  Endotracheal tube insertion site: oral  Blade: Martínez  Blade size: 3  ETT size (mm): 7.0  Cormack-Lehane Classification: grade I - full view of glottis  Placement verified by: chest auscultation and capnometry   Measured from: lips  ETT/EBT  to lips (cm): 20  Number of attempts at approach: 1  Assessment: lips, teeth, and gum same as pre-op and atraumatic intubation    Additional Comments  Negative epigastric sounds, Breath sound equal bilaterally with symmetric chest rise and fall

## 2019-12-16 NOTE — OP NOTE
TOTAL LAPAROSCOPIC HYSTERECTOMY WITH VAGINAL VAULT SUSPENSION WITH DAVINCI ROBOT  Procedure Note    Chloé Velez  2019    Pre-op Diagnosis:   Chronic pelvic pain in female [R10.2, G89.29]  Endometriosis of ovary and peritoneum [N80.9]  Post-op Diagnosis:     Post-Op Diagnosis Codes:     * Chronic pelvic pain in female [R10.2, G89.29]     * Endometriosis of ovary and peritoneum [N80.9]  Procedure/CPT® Codes:      Procedure(s):  TOTAL LAPAROSCOPIC HYSTERECTOMY, BILATERAL SALPINGECTOMIES WITH RIGHT OOPHORECTOMYVAGINAL VAULT SUSPENSION WITH DAVINCI ROBOT,    Surgeon(s):  Jose R Vela MD    Anesthesia: General    Staff:   Circulator: Elizabeth Wilkinson RN  Scrub Person: Maureen Bains; Mar Javier; Lacie Browne RN  Assistant: Viktor I have explained the use of robotic assistance.  She has wants an informational type about robotic hysterectomy.  The surgical risks of bowel, bladder, ureteral injury; bleeding, infection, and anesthetic risks were explained to the patient.  I also indicated that I cannot guarantee that her pain will be resolved with the surgery.  The patient voiced understanding of the risks of surgery.  Informed consent was signed., WADE Mcdowell    Indications for Surgery: This patient is a 44-year-old female  3 para 2 AB 1 who presented to my office earlier this year with complaints of persistent chronic pelvic pain and right lower quadrant pain.  She has previously had laparoscopy with a diagnosis of endometriosis.  She has had CO2 laser ablation of endometriosis.  She has also had a hysteroscopy/D&C/endometrial ablation and tubal sterilization by another provider.  She has had recurrent right ovarian cyst.  She desires definitive treatment because of her chronic pain and prefers to have removal of her uterus/cervix/right tube and ovary and left fallopian tube.  I have recommended a minimally-invasive approach to surgery.      Procedure: The patient was taken to  the operating suite where she was placed on the operating table in dorsal supine position.  She underwent general endotracheal anesthesia.  The patient was prepped and draped in routine fashion in dorsolithotomy position.  Timeout confirmed the correct patient and procedures.  She had been examined under anesthesia and her uterus was upper normal size and there were no adnexal masses.  Vaginally a weighted speculum and Fraire retractor were placed and the anterior lip of the cervix was grasped with a single-tooth tenaculum.  The uterus sounded to 8 cm.  The endocervix was dilated to a #14 Georgian size.  A Duarte catheter was introduced.  A Grazyna uterine elevator with a 10 cm cannula and a 3 cm ring was placed.  Attention was directed abdominally.    A supraumbilical site 10 cm above the fundus of the uterus was injected with Exparel which had been diluted 20 mL and 60 mL of normal saline.  An incision was made and an 8 mm temporary trocar was introduced using the Optiview technique.  Pelvic structures were visualized.  The uterus was normal in size.  There was evidence of endometriosis on the peritoneum in the cul-de-sac and on the pelvic sidewalls.  There was evidence of old endometriosis on the right ovary.  Right and left paramedian incisions were made 10 cm lateral and 2 cm inferior to the umbilicus after injection with Exparel.  8 mm permanent trochars were placed in those sites.  In the right upper quadrant a 10/11 mm trocar was introduced after injection with Exparel.  The 8 mm temporary trocar was exchanged for an 8.5 mm permanent camera trocar.  The patient was placed in steep Trendelenburg position and the bowel was extracted out of the pelvis.  There were no adhesions.  The da Karo robot was docked to the trochars with the camera port in the supraumbilical site and arms 1 and 2 in the paramedian sites.  A fenestrated grasper was introduced through port 2 and the Harmonic Ace 3.1.  These instruments were  identified at the uterine fundus.  I then left the patient side and proceeded to the robotic console.    The left fallopian tube was excised by coagulating and dividing the mesosalpinx beneath the tube with a Harmonic Ace on minimal pulse.  The tube was removed.  The vesicouterine peritoneum was incised transversely using the Harmonic Ace on minimal pulse.  The bladder was deflected inferiorly below the level of the ring.  The right infundibulopelvic ovarian pedicle was grasped with a Harmonic Ace of the tissue was coagulated and divided on minimal pulse.  Progressing inferiorly the broad ligament and round ligament were coagulated and divided in a similar manner.  The leaves of the broad ligament were  and incised anteriorly to the vesicouterine dissection and posteriorly to the right uterosacral ligament.  On the left side the utero-ovarian pedicle was grasped with a Harmonic Ace and the tissue was coagulated and divided on minimal pulse.  Progressing inferiorly the broad ligament and round ligament were coagulated and divided in a similar manner.  The leaves of the broad ligament were  and incised anteriorly to the vesicouterine dissection and posteriorly to the left uterosacral ligament.    A [place] bipolar grasper was introduced through port to an Monopolar lydia 3 port 1.  On the left side the ascending, main, and descending branches of the uterine artery and vein were coagulated with the [place] grasper and divided using monopolar lydia.  This dissection carried to the pericervical fascia on the left side.  The same procedure was carried out on the right side to the pericervical fascia.  Using monopolar lydia on a coagulation mode dissection was carried through the stroma and fascia anteriorly overlying the superior aspect of the ring.  Dissection was carried around the superior aspect of the ring bilaterally.  The lateral vascular bundles were coagulated with a [place] grasper and  divided using monopolar lydia.  Posteriorly dissection was carried through the peritoneum, stroma, and fascia to the superior aspect of the ring.  The specimen was extricated and was removed vaginally by the assistant.  The vaginal occluder was reinserted.  The pelvis was irrigated and suctioned.  At a pressure of 8 mmHg there was no active bleeding.  The vaginal mucosa was then reapproximated with 0 Vicryl suture in a running lock stitch.  A left angle stitch was placed using 2-0 strata fix suture in a figure-of-eight fashion with that suture also incorporating the left uterosacral ligament for vaginal cuff suspension.  A right angle stitch was placed using 2-0 strata fix suture in a figure-of-eight fashion with that suture incorporating the right uterosacral ligament for vaginal cuff suspension; that suture was run back across the vaginal cuff in a running lock stitch fashion for complete cuff closure.  The pelvis was again irrigated and suctioned.  At a pressure of 8 mmHg there was no active bleeding.  Both ureters were peristaltic.  4 cc of Tisseel was sprayed over the vaginal cuff and adnexal areas.  The laparoscopic instruments were withdrawn.  I then left the robotic console and re-scrubbed.    Back at the patient's left side the 10/11 mm trocar site was closed at the fascial level using the Alberto Ann Endo closure device with 0 Vicryl suture.  10 cc of Exparel was injected at each fascial trocar site.  The carbon dioxide was allowed to escape and the trochars were removed.  The skin incisions were reapproximated with 3-0 Vicryl in a subcuticular fashion.  Sponge counts and instrument counts were correct.  Estimated blood loss 50 cc.  The patient received 2000 cc of crystalloid.  She had 250 cc of clear urinary output.  The bladder was backfilled with 120 cc of saline.  Schedule in place throughout the procedure.  The patient received 2 g of cefoxitin for antibiotic prophylaxis.  There were no  complications and she was transferred to PACU in stable condition.    Estimated Blood Loss: 50 mL  Urine Voided: 250 mL  Fluids: 1,000 mL    Specimens:                Specimens     ID Source Type Tests Collected By Collected At Frozen?      A Uterus, Cervix, Bilateral Fallopian Tubes  Tissue · TISSUE PATHOLOGY EXAM   Jose R Vela MD 12/16/19 0831 No     Description: RIGHT OVARY    This specimen was not marked as sent.            Drains:   [REMOVED] Urethral Catheter Silicone 16 Fr. (Removed)       Complications: None      ANTONIO Vela MD     Date: 12/16/2019  Time: 8:59 AM

## 2019-12-16 NOTE — INTERVAL H&P NOTE
"/79 (BP Location: Right arm, Patient Position: Lying)   Pulse 76   Temp 97 °F (36.1 °C) (Temporal)   Resp 18   Ht 152.4 cm (60\")   Wt 71.2 kg (157 lb)   SpO2 97%   BMI 30.66 kg/m²   "

## 2019-12-18 LAB
CYTO UR: NORMAL
LAB AP CASE REPORT: NORMAL
LAB AP CLINICAL INFORMATION: NORMAL
PATH REPORT.FINAL DX SPEC: NORMAL
PATH REPORT.GROSS SPEC: NORMAL

## 2020-01-13 ENCOUNTER — OFFICE VISIT (OUTPATIENT)
Dept: OBSTETRICS AND GYNECOLOGY | Facility: CLINIC | Age: 45
End: 2020-01-13

## 2020-01-13 VITALS
HEIGHT: 60 IN | BODY MASS INDEX: 32.35 KG/M2 | DIASTOLIC BLOOD PRESSURE: 88 MMHG | SYSTOLIC BLOOD PRESSURE: 160 MMHG | WEIGHT: 164.8 LBS

## 2020-01-13 DIAGNOSIS — Z09 POSTOPERATIVE FOLLOW-UP: Primary | ICD-10-CM

## 2020-01-13 PROCEDURE — 99024 POSTOP FOLLOW-UP VISIT: CPT | Performed by: OBSTETRICS & GYNECOLOGY

## 2020-01-13 NOTE — PROGRESS NOTES
Chief Complaint   Patient presents with   • Post-op       Chloé Velez is a 44 y.o. year old  presenting to be seen for her post-operative visit following a robotically-assisted total laparoscopic hysterectomy/right salpingo-oophorectomy/left salpingectomy/vaginal cuff suspension which was done on 2019.  The patient had chronic right lower quadrant pain as well as dysmenorrhea in spite of a previous upper scopic tubal banding/laparoscopy with laser ablation of endometriosis/and a hysteroscopy/D&C/endometrial ablation.  She is delighted with her surgical response.  She has no pain.  She has no bowel or urinary symptoms.  She denies menopausal symptoms with her left ovary in situ.    Procedure:  TLH/RSO/L salpingectomy/VCS    ROS:  A comprehensive review of systems was negative.  Constitutional: negative for fever, chills, activity change, appetite change, fatigue and unexpected weight change.  Respiratory: negative   Cardiovascular: negative  Gastrointestinal: negative  Genitourinary:negative  Musculoskeletal:negative  Behavioral/Psych: negative      Symptoms:  Fever  No, Chills/Sweat  No, Nausea/Vomiting    No, Normal Bowel Function  Yes, Normal Urinary Function  Yes and Abnormal Discharge   No    No bleeding        Physical Exam:  Lungs:  Normal expansion.  Clear to auscultation.  No rales, rhonchi, or wheezing.  Abdomen:  Soft, non-tender, normal bowel sounds; no bruits, organomegaly or masses.  Incisions are intact and dry   Pelvic:  Clinical staff was present for exam  External genitalia:  normal appearance of the external genitalia including Bartholin's and [place]'s glands.  Vaginal:  normal pink mucosa without prolapse or lesions. the vaginal cuff is intact and dry  Cervix:  absent.  Uterus:  absent.  Adnexa:  LEFT adnexa normal; RIGHT adnexa absent          Impression: 1) S/P Total laparoscopic hysterectomy/right salpingo-oophorectomy/left salpingectomy/vaginal cuff suspension to  uterosacral ligaments- benign pathology, done for CPP       PLAN:  1. Follow up: 11 month(s) for AG  2. She may resume intercourse in 2 weeks, otherwise may resume normal activities.  *Please note that portions of this documentation may have been completed with a voice recognition program.  Efforts were made to edit this dictation, but occasional words may have been mistranscribed.      This note was electronically signed.    ANTONIO Vela MD  January 13, 2020  2:32 PM

## 2021-03-08 DIAGNOSIS — R89.8 ABNORMAL GENETIC TEST: Primary | ICD-10-CM

## 2021-03-18 ENCOUNTER — APPOINTMENT (OUTPATIENT)
Dept: MRI IMAGING | Facility: HOSPITAL | Age: 46
End: 2021-03-18

## 2021-03-25 ENCOUNTER — HOSPITAL ENCOUNTER (OUTPATIENT)
Dept: MRI IMAGING | Facility: HOSPITAL | Age: 46
Discharge: HOME | End: 2021-03-25
Admitting: SURGERY

## 2021-03-25 DIAGNOSIS — R89.8 ABNORMAL GENETIC TEST: ICD-10-CM

## 2021-03-25 PROCEDURE — 77049 MRI BREAST C-+ W/CAD BI: CPT

## 2021-03-25 PROCEDURE — 0 GADOBENATE DIMEGLUMINE 529 MG/ML SOLUTION: Performed by: SURGERY

## 2021-03-25 PROCEDURE — A9577 INJ MULTIHANCE: HCPCS

## 2021-03-25 PROCEDURE — 0 GADOBENATE DIMEGLUMINE 529 MG/ML SOLUTION

## 2021-03-25 PROCEDURE — A9577 INJ MULTIHANCE: HCPCS | Performed by: SURGERY

## 2021-03-25 RX ADMIN — GADOBENATE DIMEGLUMINE 15 ML: 529 INJECTION, SOLUTION INTRAVENOUS at 14:25

## 2021-04-05 ENCOUNTER — OFFICE VISIT (OUTPATIENT)
Dept: SURGERY | Facility: CLINIC | Age: 46
End: 2021-04-05

## 2021-04-05 VITALS
HEIGHT: 60 IN | DIASTOLIC BLOOD PRESSURE: 88 MMHG | WEIGHT: 180.2 LBS | HEART RATE: 80 BPM | SYSTOLIC BLOOD PRESSURE: 135 MMHG | BODY MASS INDEX: 35.38 KG/M2

## 2021-04-05 DIAGNOSIS — R92.8 ABNORMAL MRI, BREAST: Primary | ICD-10-CM

## 2021-04-05 PROCEDURE — 99203 OFFICE O/P NEW LOW 30 MIN: CPT | Performed by: SURGERY

## 2021-04-05 PROCEDURE — 76642 ULTRASOUND BREAST LIMITED: CPT | Performed by: SURGERY

## 2021-04-05 PROCEDURE — 77049 MRI BREAST C-+ W/CAD BI: CPT | Performed by: RADIOLOGY

## 2021-04-05 NOTE — PROGRESS NOTES
Subjective   Chloé Velez is a 46 y.o. female here today for MRI review.    History of Present Illness  Ms. Velez was seen in the office today for breast evaluation. 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 Patient's last mammogram was 6/5/2020 which demonstrated postoperative changes in the left breast with no findings on the right. Ms. Velez underwent a bilateral breast MRI on 3/25/2021 which demonstrated a 1.2 cm mass in the posterior right breast for which additional evaluation was recommended. Mrs. Velez denies a palpable mass or nipple discharge. There is a prior history of a benign fibroadenoma removed in 2007 and 2016. Family history is positive for breast cancer in a maternal grandmother. Patient's mother recently underwent genetic testing and did not have the CHEK2 mutation but did have a variant of uncertain significance for colon cancer.  No Known Allergies  Current Outpatient Medications   Medication Sig Dispense Refill   • escitalopram (LEXAPRO) 20 MG tablet Take 1 tablet by mouth Daily.     • famotidine (PEPCID) 20 MG tablet Take 40 mg by mouth Daily.       No current facility-administered medications for this visit.     Past Medical History:   Diagnosis Date   • Anxiety    • Endometriosis    • GERD (gastroesophageal reflux disease)    • History of ovarian cyst    • Wears glasses      Past Surgical History:   Procedure Laterality Date   • BREAST BIOPSY Left 1993    benign   • BREAST BIOPSY Left 2008    benign   • BREAST CYST EXCISION Left     x 2   • CHOLECYSTECTOMY     • D & C WITH SUCTION     • HYSTEROSCOPY ENDOMETRIAL ABLATION TUBAL STERILIZATION      D&C, drainage of ovarian cyst   • PELVIC LAPAROSCOPY     • TOTAL LAPAROSCOPIC HYSTERECTOMY VAGINAL VAULT SUSPENSION Right 12/16/2019    Procedure: TOTAL LAPAROSCOPIC HYSTERECTOMY, BILATERAL SALPINGECTOMIES WITH RIGHT OOPHORECTOMY VAGINAL VAULT SUSPENSION WITH DAVINCI ROBOT;  Surgeon:  "Jose R Vela MD;  Location: [facility];  Service: Gynecology       Pertinent Review of Systems:  Respiratory: no shortness of breath  Cardiovascular: no chest pain  Other pertinent:      Objective   Ht 152.4 cm (60\")   Wt 81.7 kg (180 lb 3.2 oz)   BMI 35.19 kg/m²   Physical Exam  Well-developed well-nourished female no acute distress  Neck:  No supraclavicular adenopathy  Lungs:  Respiratory effort normal. Auscultation: Clear, without wheezes, rhonchi, rales  Heart:  Regular rate and rhythm, without murmur, gallop, rub.  No pedal edema  Breasts: On visual inspection the breasts are symmetrical. Examination of the right breast demonstrates no discrete mass, skin change, or axillary adenopathy. Examination of the left breast demonstrates no discrete mass, skin change, or axillary adenopathy.       Procedures   Limited right breast Ultrasound    Indication: Abnormal MRI right breast    Description: With use of the 12.5 MHz transducer the area of clinical concern was scanned in multiple planes.     Findings:  Posterior 3:00 a hypoechoic mass is identified corresponding in size and location to the MRI abnormality. This is now associated with posterior shadowing. It does have fairly well-defined borders.    Impression: Solid mass right breast corresponding to MRI abnormality    Recommendation: As the lesion is fairly deep will have patient follow-up at the breast center for evaluation and consideration for biopsy    Results/Data:  Imaging: MRI reports and images were reviewed. 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 Result to be tracked         Discussion/Summary    Time spent:     Patient's Body mass index is 35.19 kg/m². BMI is above normal parameters. Recommendations include: educational material.       No " future appointments.      Please note that portions of this note were completed with a voice recognition program.

## 2021-04-07 PROBLEM — R92.8 ABNORMAL MRI, BREAST: Status: ACTIVE | Noted: 2021-04-07

## 2021-04-08 ENCOUNTER — HOSPITAL ENCOUNTER (OUTPATIENT)
Dept: MAMMOGRAPHY | Facility: HOSPITAL | Age: 46
Discharge: HOME | End: 2021-04-08

## 2021-04-08 ENCOUNTER — HOSPITAL ENCOUNTER (OUTPATIENT)
Dept: ULTRASOUND IMAGING | Facility: HOSPITAL | Age: 46
Discharge: HOME | End: 2021-04-08

## 2021-04-08 DIAGNOSIS — R93.89 ABNORMAL MRI: ICD-10-CM

## 2021-04-08 PROCEDURE — 76642 ULTRASOUND BREAST LIMITED: CPT | Performed by: RADIOLOGY

## 2021-04-08 PROCEDURE — 76642 ULTRASOUND BREAST LIMITED: CPT

## 2021-04-08 PROCEDURE — 77065 DX MAMMO INCL CAD UNI: CPT

## 2021-04-08 PROCEDURE — 77065 DX MAMMO INCL CAD UNI: CPT | Performed by: RADIOLOGY

## 2021-04-08 PROCEDURE — G0279 TOMOSYNTHESIS, MAMMO: HCPCS

## 2021-04-08 PROCEDURE — 77061 BREAST TOMOSYNTHESIS UNI: CPT | Performed by: RADIOLOGY

## 2021-04-21 ENCOUNTER — HOSPITAL ENCOUNTER (OUTPATIENT)
Dept: MAMMOGRAPHY | Facility: HOSPITAL | Age: 46
Discharge: HOME | End: 2021-04-21

## 2021-04-21 DIAGNOSIS — R92.8 ABNORMAL MAMMOGRAM: ICD-10-CM

## 2021-04-21 DIAGNOSIS — R92.8 ABNORMAL MRI, BREAST: ICD-10-CM

## 2021-04-21 PROCEDURE — 19081 BX BREAST 1ST LESION STRTCTC: CPT | Performed by: RADIOLOGY

## 2021-04-21 PROCEDURE — 77065 DX MAMMO INCL CAD UNI: CPT | Performed by: RADIOLOGY

## 2021-04-21 PROCEDURE — A4648 IMPLANTABLE TISSUE MARKER: HCPCS

## 2021-04-22 LAB — LAB AP CASE REPORT: NORMAL

## 2021-05-04 ENCOUNTER — APPOINTMENT (OUTPATIENT)
Dept: ULTRASOUND IMAGING | Facility: HOSPITAL | Age: 46
End: 2021-05-04

## 2021-05-04 ENCOUNTER — APPOINTMENT (OUTPATIENT)
Dept: MAMMOGRAPHY | Facility: HOSPITAL | Age: 46
End: 2021-05-04

## 2021-10-21 ENCOUNTER — HOSPITAL ENCOUNTER (OUTPATIENT)
Dept: MAMMOGRAPHY | Facility: HOSPITAL | Age: 46
Discharge: HOME | End: 2021-10-21
Admitting: RADIOLOGY

## 2021-10-21 DIAGNOSIS — R92.8 ABNORMAL MAMMOGRAM: ICD-10-CM

## 2021-10-21 PROCEDURE — 77066 DX MAMMO INCL CAD BI: CPT | Performed by: RADIOLOGY

## 2021-10-21 PROCEDURE — 77066 DX MAMMO INCL CAD BI: CPT

## 2021-10-21 PROCEDURE — 77062 BREAST TOMOSYNTHESIS BI: CPT | Performed by: RADIOLOGY

## 2021-10-21 PROCEDURE — G0279 TOMOSYNTHESIS, MAMMO: HCPCS

## 2025-05-06 ENCOUNTER — CLINICAL SUPPORT (OUTPATIENT)
Facility: HOSPITAL | Age: 50
End: 2025-05-06
Payer: COMMERCIAL

## 2025-05-06 DIAGNOSIS — Z80.0 FAMILY HISTORY OF COLON CANCER: ICD-10-CM

## 2025-05-06 DIAGNOSIS — Z13.79 GENETIC TESTING: Primary | ICD-10-CM

## 2025-05-06 DIAGNOSIS — Z83.719 FAMILY HISTORY OF COLONIC POLYPS: ICD-10-CM

## 2025-05-06 DIAGNOSIS — Z80.3 FAMILY HISTORY OF MALIGNANT NEOPLASM OF BREAST: ICD-10-CM

## 2025-05-06 DIAGNOSIS — Z80.42 FAMILY HISTORY OF MALIGNANT NEOPLASM OF PROSTATE: ICD-10-CM

## 2025-05-07 NOTE — PROGRESS NOTES
Genetic counseling was provided via telehealth.  Patient's name and date of birth was confirmed and confirmed that patient was physically located in [place] at the time of the appointment.    Chloé Velez, a 50 y.o. female, was referred for genetic counseling due to a family history of cancer and to discuss results of genetic testing that was performed in 2021. She was 13 years old at menarche and had her first child at 18. Her current cancer screening includes annual mammogram and annual breast MRI. She has had two breast biopsies. She reports having her first colonoscopy at age 40 but does not report a personal history of colon polyps. She has had EGDs in the past and reports some stomach polyps were removed. Ms. Velez had a hysterectomy with bilateral salpingectomy and right oophorectomy at age 44; she retains her left ovary. She reports recent blood work showed she is not menopausal. Ms. Velez's provider ordered genetic testing due to the family history of cancer. Testing was ordered via the Multi-Cancer panel at [facility], which evaluated 84 genes associated with hereditary cancer risk. Genetic testing was negative for known pathogenic (harmful) mutations in all 84 genes on this panel.  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 Ms. Velez was interested in discussing the genetic testing that was already completed.     FAMILY HISTORY:  Maternal:  Mother:    More than 20 colon polyps in her lifetime, currently age 66. Reportedly had a VUS in POLE gene on genetic testing.   Mat. Aunt:    Melanoma, 74  Mat. Uncle:    Melanoma, 31 (metastatic to brain)  Mat. Grandmother:   Breast cancer, 46  Mat. Great Aunt (x3):   Breast cancer  Mat. Great Uncle:   Colon cancer  Mat. Great Aunt:   Lung cancer  Mat. Great Grandmother:  Ovarian cancer, 80  Mat. 1st cousin:   Kidney cancer, 55  Mat.  1st cousin once removed: Glioblastoma, late 50s   Mat. 1st cousin once removed: Breast cancer    Paternal:  Father:     Prostate cancer, 63  Pat. Uncle:    Kidney cancer, 60  Pat. Grandfather:   Colon cancer, 52    We do not have medical records confirming the diagnoses in Ms. Velez's family.      GENETIC COUNSELING:  We reviewed the family history information in detail.  Cases of cancer follow three general patterns: sporadic, familial, and hereditary.  While most cancer is sporadic, some cases appear to occur in family clusters.  These cases are said to be familial and account for 10-20% of certain cancer cases.  Familial cases may be due to a combination of shared genes and environmental factors among family members.  In even fewer families (~10%), the cancer is said to be inherited, and the genes responsible for the cancer are known.      Family histories typical of hereditary cancer syndromes usually include multiple first- and second-degree relatives diagnosed with cancer types that define a syndrome.  These cases tend to be diagnosed at younger-than-expected ages and can be bilateral or multifocal.  The cancer in these families follows an autosomal dominant inheritance pattern, which indicates the likely presence of a mutation in a cancer susceptibility gene.  Children and siblings of an individual believed to carry this mutation have a 50% chance of inheriting that mutation, thereby inheriting the increased risk to develop cancer.  These mutations can be passed down from the maternal or the paternal lineage.  Hereditary breast and ovarian cancer accounts for approximately 10% of all cases of breast and ovarian cancer.  A significant proportion can be attributed to mutations in the BRCA1 and BRCA2 genes. Mutations in these genes confer an increased risk for breast cancer, ovarian cancer, male breast cancer, prostate cancer and pancreatic cancer.  However, there are other genes in addition to BRCA1/2 known  to contribute to cancer risk.  We discussed the panel testing that was already completed for Ms. Velez, which involved testing for BRCA1/2 as well as 82 additional genes that are associated with hereditary cancer risk. The testing was comprehensive and included sequencing and deletion/duplication testing of clinically significant high risk and moderate risk breast cancer associated genes, as well as genes associated with other cancer risks including colon cancer and polyposis. This testing also evaluated preliminary evidence hereditary cancer genes. Further genetic testing is not indicated at this time.     Previously ordered genetic testing was negative for known pathogenic mutations by sequencing and deletion/duplication testing for the genes on the [facility] Multi-Cancer panel.    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  A VUS is a finding that may or may not impact the function of the gene.  VUSs are not clinically actionable findings, and therefore this result does not impact management at this time.  The majority of VUSs (approximately 90%) are ultimately reclassified to benign changes.  The identification of a VUS is common in multigene panel testing, given the number of genes being evaluated.  If a VUS is ever reclassified, a new report would be issued by the laboratory and released directly to the ordering physician.  Ms. Velez is welcome to contact our office to check on current classifications of these VUSs in the future. This assessment is based on the information provided at the time of the consultation.    CANCER SCREENING: Because genetic testing was negative for known pathogenic mutations, her management should be guided by a family history-based risk assessment. Tyrer-Slimzick, version 8 is able to take into account personal factors and family history when calculating risk for breast cancer. Computer modeling estimates that Ms. Velez's  lifetime personal risk for developing breast cancer is 20.2% (Deepthi, v8), compared to the general population risk of 12.5%. A risk of 20% or greater warrants consideration of increased screening for Ms. Velez per NCCN guidelines.  Increased surveillance, based on NCCN guidelines, would consist of semi-annual clinical breast exams and monthly self-breast exams starting by age 18 and annual mammography starting 10 years younger than the earliest diagnosis in the family, or by age 40, whichever is earliest. According to an American Cancer Society expert panel and NCCN guidelines, annual breast MRI should be offered to women whose lifetime risk of breast cancer is 20-25 percent or more, typically beginning at the same age as mammography.  This risk assessment is based on the family history information provided at the time of the appointment and could change in the future should new information be obtained.     At this time, Ms. Velez EGD and colonoscopy screening should be based on her personal and family history of polyps, and she should follow the recommendations of her gastroenterologist.     PLAN:  Genetic counseling remains available to Ms. Velez. She is welcome to call our office with any questions at [phone].      Sue Aldana MS, [facility], [facility]  Licensed Certified Genetic Counselor      Cc:  LIAT Morin APRN Jennifer Young, APRN        Total time spent caring for the patient today was 45 minutes. This time includes chart review, time spent during the visit, and time spent after the visit on documentation and follow-up.

## (undated) DEVICE — [HIGH FLOW INSUFFLATOR,  DO NOT USE IF PACKAGE IS DAMAGED,  KEEP DRY,  KEEP AWAY FROM SUNLIGHT,  PROTECT FROM HEAT AND RADIOACTIVE SOURCES.]: Brand: PNEUMOSURE

## (undated) DEVICE — DRAPE,TOP,102X53,STERILE: Brand: MEDLINE

## (undated) DEVICE — KT POSTN TRENDELENBURG DLX WING PAD TABL STRAP HDRST XL 1P/U

## (undated) DEVICE — MANIP UTER RUMI TP 6.7MM 10CM GRN

## (undated) DEVICE — HARMONIC ACE

## (undated) DEVICE — ANTIBACTERIAL UNDYED BRAIDED (POLYGLACTIN 910), SYNTHETIC ABSORBABLE SURGICAL SUTURE: Brand: COATED VICRYL

## (undated) DEVICE — SEAL CANN CAM ENDOWRIST DAVINCI/S 8.5MM

## (undated) DEVICE — COVER,LIGHT HANDLE,FLX,1/PK: Brand: MEDLINE INDUSTRIES, INC.

## (undated) DEVICE — GOWN,REINF,POLY,ECL,PP SLV,XXL: Brand: MEDLINE

## (undated) DEVICE — ENDOPATH XCEL BLADELESS TROCARS WITH STABILITY SLEEVES: Brand: ENDOPATH XCEL

## (undated) DEVICE — LAP PORT CLOSURE GUIDES 5MM AND 10/12MM: Brand: LAP PORT CLOSURE GUIDES 5MM AND 10/12MM

## (undated) DEVICE — DRP ADAPT ALLY UTER POSTN SYS 1P/U

## (undated) DEVICE — APL DUPLOSPRAYER MIS 40CM

## (undated) DEVICE — APPL CHLORAPREP W/TINT 26ML BLU

## (undated) DEVICE — TIP COVER ACCESSORY

## (undated) DEVICE — MANIP UTER RUMI 2 KOH EFFICIENT 3CM BL

## (undated) DEVICE — GLV SURG SIGNATURE TOUCH PF LTX 8 STRL BX/50

## (undated) DEVICE — TROCARS: Brand: KII® OPTICAL ACCESS SYSTEM

## (undated) DEVICE — 3 RING SUTURE PASSER - 16 CM: Brand: 3 RING SUTURE PASSER - 16 CM

## (undated) DEVICE — GLV SURG SIGNATURE TOUCH PF LTX 8.5 STRL

## (undated) DEVICE — OBT BLADLES ENDOWRIST DAVINCI/S 8MM

## (undated) DEVICE — Device

## (undated) DEVICE — DECANT BG O JET

## (undated) DEVICE — BNDG ADHS PLSTC 1X3IN LF

## (undated) DEVICE — PK MAJ GYN DAVINCI 10

## (undated) DEVICE — FLTR PLUMEPORT LAP W/CONN STRL

## (undated) DEVICE — ANTIBACTERIAL UNDYED BRAIDED (POLYGLACTIN 910), SYNTHETIC ABSORBABLE SUTURE: Brand: COATED VICRYL

## (undated) DEVICE — VIOLET POLYDIOXANONE POLYMER, SYNTHETIC ABSORBABLE SUTURE CLIPS: Brand: LAPRA-TY